# Patient Record
(demographics unavailable — no encounter records)

---

## 2025-03-28 NOTE — HISTORY OF PRESENT ILLNESS
[de-identified] : Patient is a 46 y/o female who presents a follow-up visit for breast exam. She denies any trauma to the breast.  MMG/US 2/14/25 - Echogenic right breast mass 2:00 N9 for which further evaluation with targeted ultrasound is recommended. No mammographic or sonographic evidence of malignancy, left breast. (BIRADS 0, TC: 14.8%) F/u R breast sono on 2/19/25 showed probably benign finding at the 2:00 right breast for which follow-up targeted right breast sonography in 3 months is recommended. (BIRADS 3)  MMG/US (2/14/24) - No mammographic or sonographic evidence of malignancy. (TC: 16.5%, BIRADS 2)  Bilateral mammo/sono performed on 1/31/23 showed no mammographic/sonographic evidence of malignancy, including corresponding to region of clinical concern in the retroareolar/inferior right breast. There is a cyst at right 8:00 6 cm from the nipple measuring 11 mm. (BI-RADS 2)  Bilateral mammo/sono performed on 1/26/22 showed no mammographic evidence of malignancy. Probable benign hypoechoic mass 8:00 6 cm fn right breast which follow-up targeted ultrasound in 6 months is recommended. (BI-RADS 3)  Diagnostic MMG/LT US: Dense breasts. No evidence of malignancy. No solid or cystic masses identified in the area of palpable concern, left 7-8:00 5 cm fn. Only nonspecific appearing fatty and glandular tissue are evident. BI-RADS 2.  Denies nipple discharge, nipple retraction/inversion, or skin changes.  Denies breast pain.  Denies fever or chills.  Denies any previous breast biopsies. FMHx: No breast cancer. Father had lung cancer.

## 2025-03-28 NOTE — ADDENDUM
[FreeTextEntry1] : I, Madison Joshi, acted solely as a scribe for Dr. Anthony Houston on this date 03/28/2025.

## 2025-03-28 NOTE — ASSESSMENT
[FreeTextEntry1] : Right breast 2:00 likely hematoma  BI-RADS 3 imaging 2/2025  Reassured patient that index of suspicion for malignancy is low  3 month follow up R brittono 5/2025 RTO prn

## 2025-03-28 NOTE — CONSULT LETTER
[Dear  ___] : Dear  [unfilled], [Courtesy Letter:] : I had the pleasure of seeing your patient, [unfilled], in my office today. [Please see my note below.] : Please see my note below. [Sincerely,] : Sincerely, [FreeTextEntry3] : Anthony Houston MD FACS

## 2025-03-28 NOTE — PHYSICAL EXAM
[Normal] : supple, no neck mass and thyroid not enlarged [Normal Neck Lymph Nodes] : normal neck lymph nodes  [Normal Supraclavicular Lymph Nodes] : normal supraclavicular lymph nodes [Normal Groin Lymph Nodes] : normal groin lymph nodes [Normal Axillary Lymph Nodes] : normal axillary lymph nodes [Normal] : oriented to person, place and time, with appropriate affect [de-identified] : no masses or adenopathy bilaterally

## 2025-03-28 NOTE — HISTORY OF PRESENT ILLNESS
[de-identified] : Patient is a 48 y/o female who presents a follow-up visit for breast exam. She denies any trauma to the breast.  MMG/US 2/14/25 - Echogenic right breast mass 2:00 N9 for which further evaluation with targeted ultrasound is recommended. No mammographic or sonographic evidence of malignancy, left breast. (BIRADS 0, TC: 14.8%) F/u R breast sono on 2/19/25 showed probably benign finding at the 2:00 right breast for which follow-up targeted right breast sonography in 3 months is recommended. (BIRADS 3)  MMG/US (2/14/24) - No mammographic or sonographic evidence of malignancy. (TC: 16.5%, BIRADS 2)  Bilateral mammo/sono performed on 1/31/23 showed no mammographic/sonographic evidence of malignancy, including corresponding to region of clinical concern in the retroareolar/inferior right breast. There is a cyst at right 8:00 6 cm from the nipple measuring 11 mm. (BI-RADS 2)  Bilateral mammo/sono performed on 1/26/22 showed no mammographic evidence of malignancy. Probable benign hypoechoic mass 8:00 6 cm fn right breast which follow-up targeted ultrasound in 6 months is recommended. (BI-RADS 3)  Diagnostic MMG/LT US: Dense breasts. No evidence of malignancy. No solid or cystic masses identified in the area of palpable concern, left 7-8:00 5 cm fn. Only nonspecific appearing fatty and glandular tissue are evident. BI-RADS 2.  Denies nipple discharge, nipple retraction/inversion, or skin changes.  Denies breast pain.  Denies fever or chills.  Denies any previous breast biopsies. FMHx: No breast cancer. Father had lung cancer.

## 2025-03-28 NOTE — PHYSICAL EXAM
[Normal] : supple, no neck mass and thyroid not enlarged [Normal Neck Lymph Nodes] : normal neck lymph nodes  [Normal Supraclavicular Lymph Nodes] : normal supraclavicular lymph nodes [Normal Groin Lymph Nodes] : normal groin lymph nodes [Normal Axillary Lymph Nodes] : normal axillary lymph nodes [Normal] : oriented to person, place and time, with appropriate affect [de-identified] : no masses or adenopathy bilaterally